# Patient Record
Sex: MALE | Race: WHITE | URBAN - METROPOLITAN AREA
[De-identification: names, ages, dates, MRNs, and addresses within clinical notes are randomized per-mention and may not be internally consistent; named-entity substitution may affect disease eponyms.]

---

## 2017-01-02 ENCOUNTER — HOSPITAL ENCOUNTER (OUTPATIENT)
Dept: ULTRASOUND IMAGING | Facility: CLINIC | Age: 22
Discharge: HOME | End: 2017-01-02
Attending: PEDIATRICS | Admitting: PEDIATRICS
Payer: COMMERCIAL

## 2017-01-02 ENCOUNTER — OFFICE VISIT (OUTPATIENT)
Dept: ENDOCRINOLOGY | Facility: CLINIC | Age: 22
End: 2017-01-02
Attending: PEDIATRICS
Payer: COMMERCIAL

## 2017-01-02 VITALS
HEIGHT: 68 IN | SYSTOLIC BLOOD PRESSURE: 129 MMHG | BODY MASS INDEX: 36.29 KG/M2 | HEART RATE: 89 BPM | DIASTOLIC BLOOD PRESSURE: 91 MMHG | WEIGHT: 239.42 LBS

## 2017-01-02 DIAGNOSIS — N62 GYNECOMASTIA, MALE: ICD-10-CM

## 2017-01-02 DIAGNOSIS — N62 GYNECOMASTIA, MALE: Primary | ICD-10-CM

## 2017-01-02 LAB
ALBUMIN SERPL-MCNC: 3.8 G/DL (ref 3.4–5)
ALP SERPL-CCNC: 103 U/L (ref 65–260)
ALT SERPL W P-5'-P-CCNC: 49 U/L (ref 0–50)
ANION GAP SERPL CALCULATED.3IONS-SCNC: 6 MMOL/L (ref 3–14)
AST SERPL W P-5'-P-CCNC: 11 U/L (ref 0–35)
BILIRUB SERPL-MCNC: 0.4 MG/DL (ref 0.2–1.3)
BUN SERPL-MCNC: 11 MG/DL (ref 7–30)
CALCIUM SERPL-MCNC: 8.8 MG/DL (ref 8.5–10.1)
CHLORIDE SERPL-SCNC: 108 MMOL/L (ref 98–110)
CO2 SERPL-SCNC: 26 MMOL/L (ref 20–32)
CREAT SERPL-MCNC: 0.89 MG/DL (ref 0.5–1)
FSH SERPL-ACNC: 11.3 IU/L (ref 0.7–10.8)
GFR SERPL CREATININE-BSD FRML MDRD: NORMAL ML/MIN/1.7M2
GLUCOSE SERPL-MCNC: 94 MG/DL (ref 70–99)
LH SERPL-ACNC: 8.9 IU/L (ref 1.5–9.3)
POTASSIUM SERPL-SCNC: 4.2 MMOL/L (ref 3.4–5.3)
PROLACTIN SERPL-MCNC: 6 UG/L (ref 2–18)
PROT SERPL-MCNC: 7.9 G/DL (ref 6.8–8.8)
SODIUM SERPL-SCNC: 140 MMOL/L (ref 133–144)
T4 FREE SERPL-MCNC: 1.07 NG/DL (ref 0.76–1.46)
TSH SERPL DL<=0.05 MIU/L-ACNC: 2.99 MU/L (ref 0.4–4)

## 2017-01-02 PROCEDURE — 99212 OFFICE O/P EST SF 10 MIN: CPT | Mod: ZF

## 2017-01-02 PROCEDURE — 80053 COMPREHEN METABOLIC PANEL: CPT | Performed by: PEDIATRICS

## 2017-01-02 PROCEDURE — 84439 ASSAY OF FREE THYROXINE: CPT | Performed by: PEDIATRICS

## 2017-01-02 PROCEDURE — 84403 ASSAY OF TOTAL TESTOSTERONE: CPT | Performed by: PEDIATRICS

## 2017-01-02 PROCEDURE — 84443 ASSAY THYROID STIM HORMONE: CPT | Performed by: PEDIATRICS

## 2017-01-02 PROCEDURE — 84146 ASSAY OF PROLACTIN: CPT | Performed by: PEDIATRICS

## 2017-01-02 PROCEDURE — 83001 ASSAY OF GONADOTROPIN (FSH): CPT | Performed by: PEDIATRICS

## 2017-01-02 PROCEDURE — 82670 ASSAY OF TOTAL ESTRADIOL: CPT | Performed by: PEDIATRICS

## 2017-01-02 PROCEDURE — 36415 COLL VENOUS BLD VENIPUNCTURE: CPT | Performed by: PEDIATRICS

## 2017-01-02 PROCEDURE — 83002 ASSAY OF GONADOTROPIN (LH): CPT | Performed by: PEDIATRICS

## 2017-01-02 PROCEDURE — 76641 ULTRASOUND BREAST COMPLETE: CPT | Mod: 50

## 2017-01-02 RX ORDER — METHYLPHENIDATE HYDROCHLORIDE 10 MG/1
10 TABLET ORAL 2 TIMES DAILY
COMMUNITY

## 2017-01-02 RX ORDER — FLUOXETINE 10 MG/1
10 TABLET, FILM COATED ORAL DAILY
COMMUNITY

## 2017-01-02 ASSESSMENT — PAIN SCALES - GENERAL: PAINLEVEL: NO PAIN (0)

## 2017-01-02 NOTE — Clinical Note
1/2/2017      RE: Uri Loo  [address]  [facility] [address]     Pediatric Endocrinology Initial Consultation    Patient: Uri Loo MRN# [id number]   YOB: 1995 Age: 21 year old    Date of Visit: Jan 2, 2017    To whom it may concern:    I had the pleasure of seeing Uri Loo in the Pediatric Endocrinology Clinic, [facility], on Jan 2, 2017 for initial consultation regarding gynecomastia.           Problem list:     Patient Active Problem List    Diagnosis Date Noted     Gynecomastia, male 12/30/2016     Priority: Medium          HPI:   Uri Loo is a 21 year old  male with a history of risperidone treatment for aggressive behavior who comes to clinic today for initial consultation regarding gynecomastia. Uri does not recall when his excessive breast tissue was first present, but it became noticeable with significant weight loss after stopping risperidone treatment.     Uri began risperidone treatment when he was approximately 5 years old due to hyperactivity and aggressive behaviors (specifically, he wanted to beat up his younger brother often). The risperidone dose increased in strength over time. He maintained risperidone treatment until age 18 or 19 years old. Once he stopped risperidone treatment, he experienced a drastic weight loss, from 250/275 lbs down to 155 lbs in 3-4 months. It was then during the dramatic weight loss that they noticed that he had breast tissue. Uri was not exercising more than usual when he lost the weight. Uri has since gained some of the weight back and he has been maintaining about 200 lbs. Uri has not resumed risperidone therapy since he stopped.     Uri first started to notice pubertal changes with axillary hair growth when he was 15 years old. He started shaving around age 18 years, but dad reports that Uri prefers to not shave. The last time he shaved was about  a week ago, though he grows enough facial hair where he could shave every day.     Uri denies fluid ever leaking from his breasts. He denies breast tenderness or soreness. Dad does not remember Uri ever having had a prolactin level measured. Uri has not had any problems with hypothyroidism.    Uri has never been concerned about his sexual orientation or body image due to his breast tissue. However, over the past couple of years after he experienced his dramatic weight loss, Uri experienced bullying at school related to his breast tissue and dad had to pick him up from school several times.     During risperidone treatment, Uri was also taking a medication to help him sleep (Dad doesn't recall the name of the medication). Uri was also taking Strattera at the time. Dad recalls that Uri was prescribed risperidone by Dr. Martinez and Dr. Ornelas who were primary care physicians associated with [facility] and working with a psychologist. He continues to be under the care of a psychologist, Imani Streeter. Uri is currently taking Prozac 10 mg once daily as well as Ritalin 10 mg twice daily.    I have reviewed the available past laboratory evaluations, imaging studies, and medical records available to me at this visit. I have reviewed the Uri's growth chart.    History was obtained from patient and patient's father.     Birth History:   Gestational age Term  Mode of delivery Vaginal  Complications during pregnancy None  Birth weight 7+ lbs          Past Medical History:   Behavioral issues. He has not required hospitalization for behavioral concerns.           Past Surgical History:   PE tubes placed when he was a baby.             Social History:   Uri lives at home with his father and two brothers, ages 24 and 17 years.     Uri graduated high school in 2016. He struggled in school and had an IEP. Dad reports that Uri had learning challenges, but he has not been  diagnosed with a specific learning disability. His IQ test was below average. They are currently working on VTM doing things for himself so that he can live independently.           Family History:   Father is  5 feet 9 inches tall.  Mother is  5 feet 4 inches tall.   Mother's menarche is at age 13.     Father s pubertal progression : was at the normal time, per his recollection  Midparental Height is 5 feet 9 inches.  Siblings: Two brothers, ages 24 and 17 years. Neither boy has ever had noticeable breast tissue. However, they are both overweight.     History of:  Adrenal insufficiency: none.  Autoimmune disease: none.  Calcium problems: none.  Delayed puberty: none.  Diabetes mellitus: Maternal grandmother.  Early puberty: none.  Genetic disease: none.  Short stature: none.  Thyroid disease: Maternal grandfather and mother have hypothyroidism. Neither were treated with radioactive iodine ablation.     There are no other family members with known gynecomastia. Prior to pregnancy with VTM, his father's sperm count was found to be low, but they did not need assistance with conception (no other family members with fertility issues). No known family members with low testosterone.     Father has hypercholesterolemia and hypertension. Mother also has hypertension.     Early heart attack in Maternal grandfather.          Allergies:   No Known Allergies          Medications:     Current Outpatient Prescriptions   Medication Sig Dispense Refill     FLUoxetine (PROZAC) 10 MG tablet Take 10 mg by mouth daily       methylphenidate (RITALIN) 10 MG tablet Take 10 mg by mouth 2 times daily            Review of Systems:   Gen: Negative  Eye: He wears glasses.   ENT: Negative, no hearing loss.   Pulmonary:  Negative  Cardio: Negative  Gastrointestinal: Negative  Hematologic: Negative  Genitourinary: Negative, no nocturnal enuresis.   Musculoskeletal: Negative, no muscle/joint pain or aches.   Psychiatric: see HPI. Moods are  "stable on current medication regimen.   Neurologic: No headaches. Uri tends to stay up late (over the past year). Developmental delay with low IQ.  Skin: No significant acne during puberty.   Endocrine: see HPI. No temperature intolerances. No symptoms of hypoglycemia. No symptoms of hypothyroidism.             Physical Exam:   Blood pressure 129/91, pulse 89, height 5' 8.07\" (172.9 cm), weight 239 lb 6.7 oz (108.6 kg).  Blood pressure percentiles are 78% systolic and 86% diastolic based on 2000 NHANES data. Blood pressure percentile targets: 90: 134/93, 95: 138/98, 99 + 5 mmH/111.  Height: 172.9 cm 29%ile (Z=-0.55) based on [facility] 2-20 Years stature-for-age data using vitals from 2017.  Weight: 108.6 kg (actual weight), 99%ile (Z=2.21) based on [facility] 2-20 Years weight-for-age data using vitals from 2017.  BMI: Body mass index is 36.33 kg/(m^2). 99%ile (Z=2.30) based on [facility] 2-20 Years BMI-for-age data using vitals from 2017.      GENERAL:  He is alert and in no apparent distress. No distinctive facial features.   HEENT:  Head is  normocephalic and atraumatic.  Pupils equal, round and reactive to light and accommodation.  Extraocular movements are intact.  Funduscopic exam shows crisp disc margins and normal venous pulsations.  Nares are clear.  Oropharynx shows poor dentition with poor oral hygiene. He has a normal uvula and palate.  Tympanic membranes visualized and clear with scars from prior tympanostomy tubes.   NECK:  Supple. Thyroid was palpable, smooth with no nodules.    LUNGS:  Clear to auscultation bilaterally.   CARDIOVASCULAR:  Regular rate and rhythm without murmur, gallop or rub.   BREASTS:  Right: Gamaliel V with palpable fibrous breast tissue 5.5 cm in width by 5.5 cm length with palpable fatty breast tissue 8 cm by 9 cm. Left: Gamaliel V with palpable fibrous breast tissue 4.5 wide by 5 cm length with palpable fatty breast tissue 8 cm by 9 cm. No discharge expressed from nipples with " firm pressure. Both areolae are estrogenized. Axillary hair is present.   ABDOMEN:  Obese.  Positive bowel sounds, soft and nontender.  No hepatosplenomegaly or masses palpable.   GENITOURINARY EXAM:  Pubic hair is Gamaliel V.  Testes 5 cm in length on right, 4.5 cm in length on left. Phallus Gamaliel V, circumcised.   MUSCULOSKELETAL:  Normal muscle bulk and tone.  No evidence of scoliosis.   NEUROLOGIC:  Cranial nerves II-XII tested and intact.  Deep tendon reflexes 2+ and symmetric.   SKIN:  Full, normal male masculinized hair pattern with hair on face, chest, jorge-areolar area, abdomen and back. Mild acne on the back. No acanthosis nigricans. There are some pink striae on the abdominal wall related to weight gain.         Laboratory results:     Component      Latest Ref Rng 1/2/2017   Sodium      133 - 144 mmol/L 140   Potassium      3.4 - 5.3 mmol/L 4.2   Chloride      98 - 110 mmol/L 108   Carbon Dioxide      20 - 32 mmol/L 26   Anion Gap      3 - 14 mmol/L 6   Glucose      70 - 99 mg/dL 94   Urea Nitrogen      7 - 30 mg/dL 11   Creatinine      0.50 - 1.00 mg/dL 0.89   GFR Estimate      >60 mL/min/1.7m2 >90 . . .   GFR Estimate If Black      >60 mL/min/1.7m2 >90 . . .   Calcium      8.5 - 10.1 mg/dL 8.8   Bilirubin Total      0.2 - 1.3 mg/dL 0.4   Albumin      3.4 - 5.0 g/dL 3.8   Protein Total      6.8 - 8.8 g/dL 7.9   Alkaline Phosphatase      65 - 260 U/L 103   ALT      0 - 50 U/L 49   AST      0 - 35 U/L 11   Lutropin      1.5 - 9.3 IU/L 8.9   FSH      0.7 - 10.8 IU/L 11.3 (H)   Testosterone Total      240 - 950 ng/dL 305   Estradiol Ultrasensitive      10 - 40 pg/mL 23   Prolactin      2 - 18 ug/L 6   T4 Free      0.76 - 1.46 ng/dL 1.07   TSH      0.40 - 4.00 mU/L 2.99     Results for orders placed or performed during the hospital encounter of 01/02/17   US Breast Bilateral Complete 4 Quadrants    Addendum: 1/3/2017    Uri Loo  Accession # [id number]    The original report on this patient was dictated  by Dr. Fields. An  additional comment: With gynecomastia one can have an increase in  hypoechoic tissue behind the nipple.  And at times this can be very  little if any of this is seen on this examination behind either  nipple. Tissue that is seen, for the most part, has similar  echogenicity to other adjacent subcutaneous tissue.    Dee Fields MD ( Date of Addendum: 1/3/2017 )      DEE FIELDS MD      Narrative    US BREAST BILATERAL COMPLETE 4 QUADRANTS 1/2/2017 12:15 PM    HISTORY:  Recommended to mass tear.    COMPARISON:  None.    FINDINGS: Directed sonography to the retroareolar region of each  breast shows no mass or other significant focal finding.      Impression    IMPRESSION: BI-RADS CATEGORY: 1 -  NEGATIVE.    RECOMMENDED FOLLOW-UP: Clinical follow-up.    DEE FIELDS MD           Assessment and Plan:   1. Gynecomastia.   2. Obesity.  3. Developmental delay.  4. Aggressive behavior.     Uri has palpable estrogenized tissue present bilaterally. He has estrogenization of the areolae. These features are consistent with gynecomastia with persistent breast tissue. From the history and my examination, Uri has not had any expressed fluid or galactorrhea. Uri has no evidence of hypogonadism because he has fully developed male genitalia and hair pattern. The testicles are normal in size for a young adult male and make hypogonadism and Klinefelter syndrome unlikely. From the history, it is difficult to determine if the breast tissue was present before puberty occurred. However, it did persist with significant weight loss following discontinuation of risperidone.     Risperidone is known to cause gynecomastia and elevated prolactin levels in males receiving this medication. Uri was started on risperidone at age 5 years and continued until 18 or 19 years of age. Risperidone therapy was associated with significant weight gain over time. Delviss breast tissue became more noticeable with weight loss  following cessation of risperidone therapy.     Today, we obtained a breast ultrasound to quantify and characterize the breast tissue present as to whether it is fatty tissue, primarily related to excess weight, or fibrous tissue. The presence of fibrous tissue would be consistent with persistent gynecomastia.     Pubertal gynecomastia is a frequent finding during normal pubertal development that includes a small amount of breast tissue that occurs during the peak testosterone production and then resolves without the persistence of fibrous breast tissue into adulthood. Since Uri's breast tissue was not noticed until after puberty started, it is possible that the gynecomastia did not develop until he began to make puberty hormones. However, Uri continued on risperidone during puberty, and this may have aggravated his breast development, causing it to be more prominent and persist after the peak of pubertal testosterone production.     Gynecomastia in young adults is associated with hypogonadism, low testosterone, and elevated estrogen levels. We have obtained labs today to investigate the possibility of hormonal imbalance causing gynecomastia.     MD Instructions:  We will communicate results of these tests to you and any recommendations for further testing once available.  Please contact my office if you experience any breast discharge.     RESULTS INTERPRETATION: Electrolytes and liver functions are normal. Thyroid functions are normal. Prolactin is normal. The LH is normal. The FSH is very mildly elevated. The testosterone is normal for age and pubertal status.  The estradiol level is not elevated.  The breast ultrasound did not demonstrate a significant amount of fibrous breast tissue. The majority of the tissue visualized by ultrasound was fatty tissue.    Based upon these test results, there is no evidence of hyperprolactinemia or hypothyroidism. The mild elevation of FSH is unlikely to be clinically  significant. Elevations of LH and FSH can be seen in individuals with primary gonadal failure. The degree of visible virilization and normal testosterone level makes gonadal failure unlikely. Therefore, there is no evidence of a hormonal abnormality causing Uri's gynecomastia.     By palpation, the breast tissue is firm and has the consistency of fibrous breast tissue. However, the ultrasound did not demonstrate a significant amount of fibrous breast tissue distinguishable from the fatty tissue.      My medical opinion is that Uri has persistent gynecomastia without evidence for a hormonal cause.    This document serves as a record of the services and decisions personally performed and made by José Joshi MD, PhD. It was created on his behalf by Enma Noel, a trained medical scribe. The creation of this document is based on the provider's statements to the medical scribe.    Thank you for allowing me to participate in the care of your patient.  Please do not hesitate to call with questions or concerns.    Sincerely,    I personally performed the entire clinical encounter documented in this note.    José Joshi MD, PhD    Pediatric Endocrinology  [facility]  Phone: [phone]  Fax:   [phone]     Total face-to-face time 40 minutes, >50% of time spent counseling and coordination of care regarding assessment and plan described above.     MIGNON  Uri Cinda  [address] [facility] [address]

## 2017-01-02 NOTE — PROGRESS NOTES
Pediatric Endocrinology Initial Consultation    Patient: Uri Loo MRN# [id number]   YOB: 1995 Age: 21 year old    Date of Visit: Jan 2, 2017    To whom it may concern:    I had the pleasure of seeing Uri Loo in the Pediatric Endocrinology Clinic, [facility], on Jan 2, 2017 for initial consultation regarding gynecomastia.           Problem list:     Patient Active Problem List    Diagnosis Date Noted     Gynecomastia, male 12/30/2016     Priority: Medium            HPI:   Uri Loo is a 21 year old  male with a history of risperidone treatment for aggressive behavior who comes to clinic today for initial consultation regarding gynecomastia. Uri does not recall when his excessive breast tissue was first present, but it became noticeable with significant weight loss after stopping risperidone treatment.     Uri began risperidone treatment when he was approximately 5 years old due to hyperactivity and aggressive behaviors (specifically, he wanted to beat up his younger brother often). The risperidone dose increased in strength over time. He maintained risperidone treatment until age 18 or 19 years old. Once he stopped risperidone treatment, he experienced a drastic weight loss, from 250/275 lbs down to 155 lbs in 3-4 months. It was then during the dramatic weight loss that they noticed that he had breast tissue. Uri was not exercising more than usual when he lost the weight. Uri has since gained some of the weight back and he has been maintaining about 200 lbs. Uri has not resumed risperidone therapy since he stopped.     Uri first started to notice pubertal changes with axillary hair growth when he was 15 years old. He started shaving around age 18 years, but dad reports that Uri prefers to not shave. The last time he shaved was about a week ago, though he grows enough facial hair where he could shave every day.      Uri denies fluid ever leaking from his breasts. He denies breast tenderness or soreness. Dad does not remember Uri ever having had a prolactin level measured. Uri has not had any problems with hypothyroidism.    Uri has never been concerned about his sexual orientation or body image due to his breast tissue. However, over the past couple of years after he experienced his dramatic weight loss, Uri experienced bullying at school related to his breast tissue and dad had to pick him up from school several times.     During risperidone treatment, Uri was also taking a medication to help him sleep (Dad doesn't recall the name of the medication). Uri was also taking Strattera at the time. Dad recalls that Uri was prescribed risperidone by Dr. Martinez and Dr. Ornelas who were primary care physicians associated with [facility] and working with a psychologist. He continues to be under the care of a psychologist, Imani Streeter. Uri is currently taking Prozac 10 mg once daily as well as Ritalin 10 mg twice daily.    I have reviewed the available past laboratory evaluations, imaging studies, and medical records available to me at this visit. I have reviewed the Uri's growth chart.    History was obtained from patient and patient's father.     Birth History:   Gestational age Term  Mode of delivery Vaginal  Complications during pregnancy None  Birth weight 7+ lbs          Past Medical History:   Behavioral issues. He has not required hospitalization for behavioral concerns.           Past Surgical History:   PE tubes placed when he was a baby.             Social History:   Uri lives at home with his father and two brothers, ages 24 and 17 years.     Uri graduated high school in 2016. He struggled in school and had an IEP. Dad reports that Uri had learning challenges, but he has not been diagnosed with a specific learning disability. His IQ test was below average. They are  currently working on [employer] doing things for himself so that he can live independently.           Family History:   Father is  5 feet 9 inches tall.  Mother is  5 feet 4 inches tall.   Mother's menarche is at age 13.     Father s pubertal progression : was at the normal time, per his recollection  Midparental Height is 5 feet 9 inches.  Siblings: Two brothers, ages 24 and 17 years. Neither boy has ever had noticeable breast tissue. However, they are both overweight.     History of:  Adrenal insufficiency: none.  Autoimmune disease: none.  Calcium problems: none.  Delayed puberty: none.  Diabetes mellitus: Maternal grandmother.  Early puberty: none.  Genetic disease: none.  Short stature: none.  Thyroid disease: Maternal grandfather and mother have hypothyroidism. Neither were treated with radioactive iodine ablation.     There are no other family members with known gynecomastia. Prior to pregnancy with Uri, his father's sperm count was found to be low, but they did not need assistance with conception (no other family members with fertility issues). No known family members with low testosterone.     Father has hypercholesterolemia and hypertension. Mother also has hypertension.     Early heart attack in Maternal grandfather.          Allergies:   No Known Allergies          Medications:     Current Outpatient Prescriptions   Medication Sig Dispense Refill     FLUoxetine (PROZAC) 10 MG tablet Take 10 mg by mouth daily       methylphenidate (RITALIN) 10 MG tablet Take 10 mg by mouth 2 times daily               Review of Systems:   Gen: Negative  Eye: He wears glasses.   ENT: Negative, no hearing loss.   Pulmonary:  Negative  Cardio: Negative  Gastrointestinal: Negative  Hematologic: Negative  Genitourinary: Negative, no nocturnal enuresis.   Musculoskeletal: Negative, no muscle/joint pain or aches.   Psychiatric: see HPI. Moods are stable on current medication regimen.   Neurologic: No headaches. Uri tends to  "stay up late (over the past year). Developmental delay with low IQ.  Skin: No significant acne during puberty.   Endocrine: see HPI. No temperature intolerances. No symptoms of hypoglycemia. No symptoms of hypothyroidism.             Physical Exam:   Blood pressure 129/91, pulse 89, height 5' 8.07\" (172.9 cm), weight 239 lb 6.7 oz (108.6 kg).  Blood pressure percentiles are 78% systolic and 86% diastolic based on 2000 NHANES data. Blood pressure percentile targets: 90: 134/93, 95: 138/98, 99 + 5 mmH/111.  Height: 172.9 cm 29%ile (Z=-0.55) based on CDC 2-20 Years stature-for-age data using vitals from 2017.  Weight: 108.6 kg (actual weight), 99%ile (Z=2.21) based on CDC 2-20 Years weight-for-age data using vitals from 2017.  BMI: Body mass index is 36.33 kg/(m^2). 99%ile (Z=2.30) based on CDC 2-20 Years BMI-for-age data using vitals from 2017.      GENERAL:  He is alert and in no apparent distress. No distinctive facial features.   HEENT:  Head is  normocephalic and atraumatic.  Pupils equal, round and reactive to light and accommodation.  Extraocular movements are intact.  Funduscopic exam shows crisp disc margins and normal venous pulsations.  Nares are clear.  Oropharynx shows poor dentition with poor oral hygiene. He has a normal uvula and palate.  Tympanic membranes visualized and clear with scars from prior tympanostomy tubes.   NECK:  Supple. Thyroid was palpable, smooth with no nodules.    LUNGS:  Clear to auscultation bilaterally.   CARDIOVASCULAR:  Regular rate and rhythm without murmur, gallop or rub.   BREASTS:  Right: Gamaliel V with palpable fibrous breast tissue 5.5 cm in width by 5.5 cm length with palpable fatty breast tissue 8 cm by 9 cm. Left: Gamaliel V with palpable fibrous breast tissue 4.5 wide by 5 cm length with palpable fatty breast tissue 8 cm by 9 cm. No discharge expressed from nipples with firm pressure. Both areolae are estrogenized. Axillary hair is present.   ABDOMEN:  " Obese.  Positive bowel sounds, soft and nontender.  No hepatosplenomegaly or masses palpable.   GENITOURINARY EXAM:  Pubic hair is Gamaliel V.  Testes 5 cm in length on right, 4.5 cm in length on left. Phallus Gamaliel V, circumcised.   MUSCULOSKELETAL:  Normal muscle bulk and tone.  No evidence of scoliosis.   NEUROLOGIC:  Cranial nerves II-XII tested and intact.  Deep tendon reflexes 2+ and symmetric.   SKIN:  Full, normal male masculinized hair pattern with hair on face, chest, jorge-areolar area, abdomen and back. Mild acne on the back. No acanthosis nigricans. There are some pink striae on the abdominal wall related to weight gain.         Laboratory results:     Component      Latest Ref Rng 1/2/2017   Sodium      133 - 144 mmol/L 140   Potassium      3.4 - 5.3 mmol/L 4.2   Chloride      98 - 110 mmol/L 108   Carbon Dioxide      20 - 32 mmol/L 26   Anion Gap      3 - 14 mmol/L 6   Glucose      70 - 99 mg/dL 94   Urea Nitrogen      7 - 30 mg/dL 11   Creatinine      0.50 - 1.00 mg/dL 0.89   GFR Estimate      >60 mL/min/1.7m2 >90 . . .   GFR Estimate If Black      >60 mL/min/1.7m2 >90 . . .   Calcium      8.5 - 10.1 mg/dL 8.8   Bilirubin Total      0.2 - 1.3 mg/dL 0.4   Albumin      3.4 - 5.0 g/dL 3.8   Protein Total      6.8 - 8.8 g/dL 7.9   Alkaline Phosphatase      65 - 260 U/L 103   ALT      0 - 50 U/L 49   AST      0 - 35 U/L 11   Lutropin      1.5 - 9.3 IU/L 8.9   FSH      0.7 - 10.8 IU/L 11.3 (H)   Testosterone Total      240 - 950 ng/dL 305   Estradiol Ultrasensitive      10 - 40 pg/mL 23   Prolactin      2 - 18 ug/L 6   T4 Free      0.76 - 1.46 ng/dL 1.07   TSH      0.40 - 4.00 mU/L 2.99     Results for orders placed or performed during the hospital encounter of 01/02/17   US Breast Bilateral Complete 4 Quadrants    Addendum: 1/3/2017    Uri Cinda  Accession # [id number]    The original report on this patient was dictated by Dr. Fields. An  additional comment: With gynecomastia one can have an increase  in  hypoechoic tissue behind the nipple.  And at times this can be very  little if any of this is seen on this examination behind either  nipple. Tissue that is seen, for the most part, has similar  echogenicity to other adjacent subcutaneous tissue.    Dee Fields MD ( Date of Addendum: 1/3/2017 )      DEE FIELDS MD      Narrative    US BREAST BILATERAL COMPLETE 4 QUADRANTS 1/2/2017 12:15 PM    HISTORY:  Recommended to mass tear.    COMPARISON:  None.    FINDINGS: Directed sonography to the retroareolar region of each  breast shows no mass or other significant focal finding.      Impression    IMPRESSION: BI-RADS CATEGORY: 1 -  NEGATIVE.    RECOMMENDED FOLLOW-UP: Clinical follow-up.    DEE FIELDS MD           Assessment and Plan:   1. Gynecomastia.   2. Obesity.  3. Developmental delay.  4. Aggressive behavior.     Uri has palpable estrogenized tissue present bilaterally. He has estrogenization of the areolae. These features are consistent with gynecomastia with persistent breast tissue. From the history and my examination, Uri has not had any expressed fluid or galactorrhea. Uri has no evidence of hypogonadism because he has fully developed male genitalia and hair pattern. The testicles are normal in size for a young adult male and make hypogonadism and Klinefelter syndrome unlikely. From the history, it is difficult to determine if the breast tissue was present before puberty occurred. However, it did persist with significant weight loss following discontinuation of risperidone.     Risperidone is known to cause gynecomastia and elevated prolactin levels in males receiving this medication. Uri was started on risperidone at age 5 years and continued until 18 or 19 years of age. Risperidone therapy was associated with significant weight gain over time. Delviss breast tissue became more noticeable with weight loss following cessation of risperidone therapy.     Today, we obtained a breast  ultrasound to quantify and characterize the breast tissue present as to whether it is fatty tissue, primarily related to excess weight, or fibrous tissue. The presence of fibrous tissue would be consistent with persistent gynecomastia.     Pubertal gynecomastia is a frequent finding during normal pubertal development that includes a small amount of breast tissue that occurs during the peak testosterone production and then resolves without the persistence of fibrous breast tissue into adulthood. Since Uri's breast tissue was not noticed until after puberty started, it is possible that the gynecomastia did not develop until he began to make puberty hormones. However, Uri continued on risperidone during puberty, and this may have aggravated his breast development, causing it to be more prominent and persist after the peak of pubertal testosterone production.     Gynecomastia in young adults is associated with hypogonadism, low testosterone, and elevated estrogen levels. We have obtained labs today to investigate the possibility of hormonal imbalance causing gynecomastia.     MD Instructions:  We will communicate results of these tests to you and any recommendations for further testing once available.  Please contact my office if you experience any breast discharge.     RESULTS INTERPRETATION: Electrolytes and liver functions are normal. Thyroid functions are normal. Prolactin is normal. The LH is normal. The FSH is very mildly elevated. The testosterone is normal for age and pubertal status.  The estradiol level is not elevated.  The breast ultrasound did not demonstrate a significant amount of fibrous breast tissue. The majority of the tissue visualized by ultrasound was fatty tissue.    Based upon these test results, there is no evidence of hyperprolactinemia or hypothyroidism. The mild elevation of FSH is unlikely to be clinically significant. Elevations of LH and FSH can be seen in individuals with primary  gonadal failure. The degree of visible virilization and normal testosterone level makes gonadal failure unlikely. Therefore, there is no evidence of a hormonal abnormality causing Uri's gynecomastia.     By palpation, the breast tissue is firm and has the consistency of fibrous breast tissue. However, the ultrasound did not demonstrate a significant amount of fibrous breast tissue distinguishable from the fatty tissue.      My medical opinion is that Uri has persistent gynecomastia without evidence for a hormonal cause.    This document serves as a record of the services and decisions personally performed and made by José Joshi MD, PhD. It was created on his behalf by Enma Noel, a trained medical scribe. The creation of this document is based on the provider's statements to the medical scribe.    Thank you for allowing me to participate in the care of your patient.  Please do not hesitate to call with questions or concerns.    Sincerely,    I personally performed the entire clinical encounter documented in this note.    José Joshi MD, PhD    Pediatric Endocrinology  [facility]  Phone: [phone]  Fax:   [phone]     Total face-to-face time 40 minutes, >50% of time spent counseling and coordination of care regarding assessment and plan described above.     CC  Uri Cinda  [address] [facility] [address]

## 2017-01-02 NOTE — PATIENT INSTRUCTIONS
Thank you for choosing [facility].  It was a pleasure to see you for your office visit today.   José Joshi MD PhD, Humberto Rausch MD,   Sayda Juarez, [facility],  Socorro Olea, RN CNP  Luz Chiang MD    If you had any blood work, imaging or other tests:  Normal test results will be mailed to your home address in a letter.  Abnormal results will be communicated to you via phone call / letter.  Please allow 2 weeks for processing/interpretation of most lab work.  For urgent issues that cannot wait until the next business day, call [phone] and ask for the Pediatric Endocrinologist on call.    RN Care Coordinators (non urgent) Mon- Fri:  Cady Can MS,RN  [phone]  Selma Schaefer -[phone]  Please leave a message on one line only. Calls will be returned as soon as possible.  Requests for results will be returned after your physician has been able to review the results.  Main Office: [phone]  Fax: [phone]  Growth Hormone Coordinator:  Christine Ruiz [phone]  Medication renewals: Contact your pharmacy. Allow 3-4 days for completion.     Scheduling:    Pediatric Call Center, [phone]  Infusion Center: [phone] (for stimulation tests)  Radiology/ [phone]     Services:   [phone]     Please try the Passport to [facility] ([facility]) phone application for Virtual Tours, Procedure Preparation, Resources, Preparation for Hospital Stay and the Coloring Board.     MD Instructions:  We will communicate results of these tests to you and any recommendations for further testing once available.  Please contact my office if you experience any breast discharge.

## 2017-01-02 NOTE — MR AVS SNAPSHOT
After Visit Summary   2017    Uri Loo    MRN: [id number]           Patient Information     Date Of Birth          1995        Visit Information        Provider Department      2017 2:45 PM José Joshi MD Pediatric Endocrinology        Care Instructions    Thank you for choosing [facility].  It was a pleasure to see you for your office visit today.   José Joshi MD PhD, Humberto Rausch MD,   Sayda Juarez, [facility],  Socorro Olea RN CNP  Luz Chiang MD    If you had any blood work, imaging or other tests:  Normal test results will be mailed to your home address in a letter.  Abnormal results will be communicated to you via phone call / letter.  Please allow 2 weeks for processing/interpretation of most lab work.  For urgent issues that cannot wait until the next business day, call [phone] and ask for the Pediatric Endocrinologist on call.    RN Care Coordinators (non urgent) Mon- Fri:  Cady Can MS,RN  [phone]  Selma Schaefer -[phone]  Please leave a message on one line only. Calls will be returned as soon as possible.  Requests for results will be returned after your physician has been able to review the results.  Main Office: [phone]  Fax: [phone]  Growth Hormone Coordinator:  Christine Ruiz [phone]  Medication renewals: Contact your pharmacy. Allow 3-4 days for completion.     Scheduling:    Pediatric Call Center, [phone]  Infusion Center: [phone] (for stimulation tests)  Radiology/ [phone]     Services:   [phone]     Please try the Passport to [facility] ([facility]) phone application for Virtual Tours, Procedure Preparation, Resources, Preparation for Hospital Stay and the Coloring Board.     MD Instructions:  We will communicate results of these tests to you and any recommendations for further testing once available.  Please contact  my office if you experience any breast discharge.        Follow-ups after your visit        Follow-up notes from your care team     Return if symptoms worsen or fail to improve.      Your next 10 appointments already scheduled     Jan 02, 2017 12:30 PM   US BREAST JUDAH CMPL 4 QUAD with [id number]   [facility]    [address]  [facility] [address]   [phone]           Please bring a list of your medicines (including vitamins, minerals and over-the-counter drugs). Also, tell your doctor about any allergies you may have. Wear comfortable clothes and leave your valuables at home.  You do not need to do anything special to prepare for your exam.  Please call the Imaging Department at your exam site with any questions.            Jan 02, 2017  2:45 PM   New Patient Visit with José Joshi MD   Pediatric Endocrinology ([facility])    [facility]  [address] [place] [address] [phone]              Who to contact     Please call your clinic at [phone] to:    Ask questions about your health    Make or cancel appointments    Discuss your medicines    Learn about your test results    Speak to your doctor   If you have compliments or concerns about an experience at your clinic, or if you wish to file a complaint, please contact [facility] Patient Relations at [phone] or email us at [email or web address]         Additional Information About Your Visit        MyChart Information     [employer] is an electronic gateway that provides easy, online access to your medical records. With [employer] you can request a clinic appointment, read your test results, renew a prescription or communicate with your care team.     To sign up for [employer] visit the website at [email or web address]   You will be asked to enter the access code listed below, as well as some personal information.  "Please follow the directions to create your username and password.     Your access code is: [email or web address]  Expires: 2017  9:44 AM     Your access code will  in 90 days. If you need help or a new code, please contact your [facility] or call [phone] for assistance.        Your Vitals Were     Pulse Height BMI (Body Mass Index)             89 5' 8.07\" (172.9 cm) 36.33 kg/m2          Blood Pressure from Last 3 Encounters:   17 129/91    Weight from Last 3 Encounters:   17 239 lb 6.7 oz (108.6 kg) (99 %*, Z = 2.21)     * Growth percentiles are based on CDC 2-20 Years data.              Today, you had the following     No orders found for display       Primary Care Provider    None Specified       No primary provider on file.        Thank you!     Thank you for choosing PEDIATRIC ENDOCRINOLOGY  for your care. Our goal is always to provide you with excellent care. Hearing back from our patients is one way we can continue to improve our services. Please take a few minutes to complete the written survey that you may receive in the mail after your visit with us. Thank you!             Your Updated Medication List - Protect others around you: Learn how to safely use, store and throw away your medicines at www.disposemymeds.org.          This list is accurate as of: 17  9:44 AM.  Always use your most recent med list.                   Brand Name Dispense Instructions for use    FLUoxetine 10 MG tablet    PROzac     Take 10 mg by mouth daily       methylphenidate 10 MG tablet    RITALIN     Take 10 mg by mouth 2 times daily         "

## 2017-01-02 NOTE — NURSING NOTE
Chief Complaint   Patient presents with     Consult     gynecomastia     172.5cm, 173.1cm, 173cm, Ave: 172.9cm

## 2017-01-03 LAB — TESTOST SERPL-MCNC: 305 NG/DL (ref 240–950)

## 2017-01-05 LAB — ESTRADIOL SERPL HS-MCNC: 23 PG/ML (ref 10–40)
